# Patient Record
Sex: MALE | Race: WHITE | NOT HISPANIC OR LATINO | Employment: FULL TIME | URBAN - METROPOLITAN AREA
[De-identification: names, ages, dates, MRNs, and addresses within clinical notes are randomized per-mention and may not be internally consistent; named-entity substitution may affect disease eponyms.]

---

## 2018-10-17 ENCOUNTER — OFFICE VISIT (OUTPATIENT)
Dept: NEUROSURGERY | Facility: CLINIC | Age: 48
End: 2018-10-17

## 2018-10-17 VITALS
WEIGHT: 246 LBS | BODY MASS INDEX: 36.43 KG/M2 | SYSTOLIC BLOOD PRESSURE: 140 MMHG | DIASTOLIC BLOOD PRESSURE: 90 MMHG | HEIGHT: 69 IN | TEMPERATURE: 97.8 F

## 2018-10-17 DIAGNOSIS — Z72.0 TOBACCO ABUSE: ICD-10-CM

## 2018-10-17 DIAGNOSIS — G89.29 CHRONIC BILATERAL LOW BACK PAIN WITH RIGHT-SIDED SCIATICA: Primary | ICD-10-CM

## 2018-10-17 DIAGNOSIS — A15.9 TUBERCULOSIS: ICD-10-CM

## 2018-10-17 DIAGNOSIS — M54.41 CHRONIC BILATERAL LOW BACK PAIN WITH RIGHT-SIDED SCIATICA: Primary | ICD-10-CM

## 2018-10-17 DIAGNOSIS — B19.20 HEPATITIS C VIRUS INFECTION WITHOUT HEPATIC COMA, UNSPECIFIED CHRONICITY: ICD-10-CM

## 2018-10-17 PROCEDURE — 99243 OFF/OP CNSLTJ NEW/EST LOW 30: CPT | Performed by: NEUROLOGICAL SURGERY

## 2018-10-17 RX ORDER — TIZANIDINE 4 MG/1
4 TABLET ORAL NIGHTLY PRN
COMMUNITY

## 2018-10-17 RX ORDER — OMEPRAZOLE 40 MG/1
40 CAPSULE, DELAYED RELEASE ORAL DAILY
COMMUNITY

## 2018-10-17 RX ORDER — GABAPENTIN 800 MG/1
800 TABLET ORAL 3 TIMES DAILY
COMMUNITY

## 2018-10-17 RX ORDER — IBUPROFEN 800 MG/1
800 TABLET ORAL 3 TIMES DAILY
COMMUNITY

## 2018-10-17 NOTE — PROGRESS NOTES
Subjective     Chief Complaint: back and right leg pain    Patient ID: Frank Ho is a 48 y.o. male seen for consultation today at the request of  Ulysses Nick,*    Back Pain   This is a chronic problem. The current episode started more than 1 year ago. The problem occurs constantly. The problem is unchanged. The pain is present in the gluteal, lumbar spine and sacro-iliac. The quality of the pain is described as aching. The pain radiates to the right foot. The pain is at a severity of 7/10. The pain is moderate. The pain is worse during the day. The symptoms are aggravated by bending, twisting and standing. Stiffness is present in the morning. Associated symptoms include headaches and leg pain. Pertinent negatives include no abdominal pain, chest pain, dysuria, fever, numbness or weakness. Risk factors include obesity. He has tried analgesics, chiropractic manipulation, home exercises, heat and NSAIDs for the symptoms. The treatment provided no relief.   Leg Pain    Pertinent negatives include no numbness.       This is a 48-year-old man who presents to my office with an approximately 2 year history of low back pain and right-sided sciatica.  He has undergone pain management and physical therapy.  He is on high-dose Neurontin.  None of these modalities effectively alleviated his pain.  He denies any bladder/bowel incontinence.    He is an active tobacco abuser.  He also has a history of tuberculosis and hepatitis C.    The following portions of the patient's history were reviewed and updated as appropriate: allergies, current medications, past family history, past medical history, past social history, past surgical history and problem list.    Family history:   Family History   Problem Relation Age of Onset   • Cancer Maternal Grandfather    • Diabetes Maternal Grandfather        Social history:   Social History     Social History   • Marital status: Single     Spouse name: N/A   • Number of  children: N/A   • Years of education: N/A     Occupational History   • Not on file.     Social History Main Topics   • Smoking status: Current Every Day Smoker   • Smokeless tobacco: Not on file   • Alcohol use Yes   • Drug use: No   • Sexual activity: Defer     Other Topics Concern   • Not on file     Social History Narrative   • No narrative on file       Review of Systems   Constitutional: Negative for activity change, appetite change, chills, diaphoresis, fatigue, fever and unexpected weight change.   HENT: Positive for dental problem. Negative for congestion, drooling, ear discharge, ear pain, facial swelling, hearing loss, mouth sores, nosebleeds, postnasal drip, rhinorrhea, sinus pressure, sneezing, sore throat, tinnitus, trouble swallowing and voice change.    Eyes: Negative for photophobia, pain, discharge, redness, itching and visual disturbance.   Respiratory: Negative for apnea, cough, choking, chest tightness, shortness of breath, wheezing and stridor.    Cardiovascular: Negative for chest pain, palpitations and leg swelling.   Gastrointestinal: Negative for abdominal distention, abdominal pain, anal bleeding, blood in stool, constipation, diarrhea, nausea, rectal pain and vomiting.   Endocrine: Negative for cold intolerance, heat intolerance, polydipsia, polyphagia and polyuria.   Genitourinary: Negative for decreased urine volume, difficulty urinating, dysuria, enuresis, flank pain, frequency, genital sores, hematuria and urgency.   Musculoskeletal: Positive for arthralgias, back pain, myalgias, neck pain and neck stiffness. Negative for gait problem and joint swelling.   Skin: Negative for color change, pallor, rash and wound.   Allergic/Immunologic: Negative for environmental allergies, food allergies and immunocompromised state.   Neurological: Positive for headaches. Negative for dizziness, tremors, seizures, syncope, facial asymmetry, speech difficulty, weakness, light-headedness and numbness.  "  Hematological: Negative for adenopathy. Does not bruise/bleed easily.   Psychiatric/Behavioral: Negative for agitation, behavioral problems, confusion, decreased concentration, dysphoric mood, hallucinations, self-injury, sleep disturbance and suicidal ideas. The patient is not nervous/anxious and is not hyperactive.        Objective   Blood pressure 140/90, temperature 97.8 °F (36.6 °C), temperature source Temporal Artery , height 175.3 cm (69\"), weight 112 kg (246 lb).  Body mass index is 36.33 kg/m².    Physical Exam   Constitutional: He is oriented to person, place, and time. He appears well-developed.  Non-toxic appearance.   HENT:   Head: Normocephalic and atraumatic.   Right Ear: Hearing normal.   Left Ear: Hearing normal.   Nose: Nose normal.   Eyes: Pupils are equal, round, and reactive to light. Conjunctivae, EOM and lids are normal.   Neck: Normal range of motion. No JVD present.   Cardiovascular: Normal rate and regular rhythm.    Pulses:       Radial pulses are 2+ on the right side, and 2+ on the left side.   Pulmonary/Chest: Effort normal. No stridor. No respiratory distress. He has no wheezes.   Musculoskeletal:        Right hip: He exhibits normal range of motion and normal strength.        Left hip: He exhibits normal range of motion and normal strength.        Thoracic back: He exhibits no tenderness, no swelling, no pain and no spasm.        Lumbar back: He exhibits no tenderness, no bony tenderness, no swelling, no pain and no spasm.   Muscle Group    L          R  Hip Flexor          5          5  Knee Extensor  5          5  ADF                   5          5  APF                   5          5  EHL                   5          5   Neurological: He is alert and oriented to person, place, and time. He has normal strength. He displays abnormal reflex. No cranial nerve deficit or sensory deficit. He exhibits normal muscle tone. He displays a negative Romberg sign. GCS eye subscore is 4. GCS " verbal subscore is 5. GCS motor subscore is 6.   Reflex Scores:       Tricep reflexes are 2+ on the right side and 2+ on the left side.       Bicep reflexes are 2+ on the right side and 2+ on the left side.       Brachioradialis reflexes are 2+ on the right side and 2+ on the left side.       Patellar reflexes are 2+ on the right side and 2+ on the left side.       Achilles reflexes are 0 on the right side and 0 on the left side.  Unable to perform toe raises gait, however no objective motor weakness is detected in his bilateral lower extremities.   Skin: Skin is warm and dry. No rash noted. No erythema.   Psychiatric: He has a normal mood and affect. His behavior is normal. Judgment and thought content normal.   Nursing note and vitals reviewed.        Assessment/Plan     Independent Review of Radiographic Studies:      Available for my review is a MRI of the lumbar spine that was performed on 8/1/18.  This demonstrates moderate to severe, diffuse degenerative disc disease throughout the lumbar spine.  There is a grade 1 spondylolisthesis of L2 on L3.  There is moderate to severe facet arthropathy.  There is no significant lateral recess or neural foraminal stenosis at L3 4.  L4 5 is unremarkable with the exception of some moderate facet arthropathy.  There is L5-S1 demonstrates a broad-based disc bulge which is slightly eccentric to the right side and causes moderate lateral recess stenosis.  There is no significant neural foraminal stenosis there is moderate neural foraminal stenosis on the left side at L5-S1.  There is a suggestion of bilateral pars defects at L5-S1, but with no obvious evidence of a spondylolisthesis.    Medical Decision Making:      This is a 48-year-old man with severe right-sided sciatica and chronic low back pain.  His pain distribution is consistent with an L5 radiculopathy, with a component of an S1 radiculopathy as well.  There is no clear structural etiology for his sciatica on the  basis of his MRI, however I am suspicious that he has a bilateral pars defect and a subtle mobile spondylolisthesis which may be causing impingement of the exiting nerve root at L5-S1.  I have subsequently ordered a CT myelogram of the lumbar spine to assess for dynamic instability.  I would like to follow up with the patient after the aforementioned study has been completed.     was seen today for back pain and leg pain.    Diagnoses and all orders for this visit:    Chronic bilateral low back pain with right-sided sciatica  -     Case Request Cath Lab: IR myelogram, lumbar    Tuberculosis    Hepatitis C virus infection without hepatic coma, unspecified chronicity    Tobacco abuse        No Follow-up on file.           This document signed by LISBET Zendejas MD October 17, 2018 12:00 PM

## 2018-10-18 PROBLEM — M54.41 CHRONIC BILATERAL LOW BACK PAIN WITH RIGHT-SIDED SCIATICA: Status: ACTIVE | Noted: 2018-10-18

## 2018-10-18 PROBLEM — G89.29 CHRONIC BILATERAL LOW BACK PAIN WITH RIGHT-SIDED SCIATICA: Status: ACTIVE | Noted: 2018-10-18

## 2018-10-24 ENCOUNTER — HOSPITAL ENCOUNTER (OUTPATIENT)
Facility: HOSPITAL | Age: 48
Setting detail: HOSPITAL OUTPATIENT SURGERY
Discharge: HOME | End: 2018-10-24
Attending: RADIOLOGY | Admitting: NEUROLOGICAL SURGERY

## 2018-10-24 ENCOUNTER — APPOINTMENT (OUTPATIENT)
Dept: CT IMAGING | Facility: HOSPITAL | Age: 48
End: 2018-10-24

## 2018-10-24 VITALS
HEART RATE: 66 BPM | TEMPERATURE: 98 F | SYSTOLIC BLOOD PRESSURE: 126 MMHG | BODY MASS INDEX: 34.94 KG/M2 | OXYGEN SATURATION: 97 % | DIASTOLIC BLOOD PRESSURE: 87 MMHG | HEIGHT: 69 IN | WEIGHT: 235.89 LBS | RESPIRATION RATE: 16 BRPM

## 2018-10-24 DIAGNOSIS — M54.41 CHRONIC BILATERAL LOW BACK PAIN WITH RIGHT-SIDED SCIATICA: ICD-10-CM

## 2018-10-24 DIAGNOSIS — G89.29 CHRONIC BILATERAL LOW BACK PAIN WITH RIGHT-SIDED SCIATICA: ICD-10-CM

## 2018-10-24 PROCEDURE — 62304 MYELOGRAPHY LUMBAR INJECTION: CPT | Performed by: RADIOLOGY

## 2018-10-24 PROCEDURE — 72132 CT LUMBAR SPINE W/DYE: CPT

## 2018-10-24 PROCEDURE — 0 IOPAMIDOL 41 % SOLUTION: Performed by: RADIOLOGY

## 2018-10-24 RX ORDER — LIDOCAINE HYDROCHLORIDE 10 MG/ML
INJECTION, SOLUTION EPIDURAL; INFILTRATION; INTRACAUDAL; PERINEURAL AS NEEDED
Status: DISCONTINUED | OUTPATIENT
Start: 2018-10-24 | End: 2018-10-24 | Stop reason: HOSPADM

## 2018-10-24 NOTE — PLAN OF CARE
Problem: Patient Care Overview  Goal: Plan of Care Review  Outcome: Outcome(s) achieved Date Met: 10/24/18   10/24/18 1115   Coping/Psychosocial   Plan of Care Reviewed With patient   Plan of Care Review   Progress no change   OTHER   Outcome Summary The patient's site stable at time of discharge. The patient is being discharged home with family.      Goal: Individualization and Mutuality  Outcome: Outcome(s) achieved Date Met: 10/24/18    Goal: Discharge Needs Assessment  Outcome: Outcome(s) achieved Date Met: 10/24/18    Goal: Interprofessional Rounds/Family Conf  Outcome: Outcome(s) achieved Date Met: 10/24/18

## 2018-10-31 ENCOUNTER — OFFICE VISIT (OUTPATIENT)
Dept: NEUROSURGERY | Facility: CLINIC | Age: 48
End: 2018-10-31

## 2018-10-31 VITALS
TEMPERATURE: 97.6 F | BODY MASS INDEX: 36.14 KG/M2 | WEIGHT: 244 LBS | DIASTOLIC BLOOD PRESSURE: 90 MMHG | SYSTOLIC BLOOD PRESSURE: 148 MMHG | HEIGHT: 69 IN

## 2018-10-31 DIAGNOSIS — M54.41 CHRONIC BILATERAL LOW BACK PAIN WITH RIGHT-SIDED SCIATICA: Primary | ICD-10-CM

## 2018-10-31 DIAGNOSIS — G89.29 CHRONIC BILATERAL LOW BACK PAIN WITH RIGHT-SIDED SCIATICA: Primary | ICD-10-CM

## 2018-10-31 PROCEDURE — 99214 OFFICE O/P EST MOD 30 MIN: CPT | Performed by: NEUROLOGICAL SURGERY

## 2018-10-31 NOTE — PROGRESS NOTES
Subjective     Chief Complaint: Low back pain, right-sided sciatica    Patient ID: Frank Ho is a 48 y.o. male is here today for follow-up.    Back Pain   This is a chronic problem. The current episode started more than 1 year ago. The problem occurs constantly. The problem is unchanged. The pain is present in the gluteal, lumbar spine and sacro-iliac. The quality of the pain is described as aching. The pain radiates to the right foot. The pain is at a severity of 7/10. The pain is moderate. The pain is worse during the day. The symptoms are aggravated by bending, twisting and standing. Stiffness is present in the morning. Associated symptoms include headaches and leg pain. Pertinent negatives include no abdominal pain, chest pain, dysuria, fever, numbness or weakness. Risk factors include obesity. He has tried analgesics, chiropractic manipulation, home exercises, heat and NSAIDs for the symptoms. The treatment provided no relief.   Leg Pain    Pertinent negatives include no numbness.       This is a 48-year-old man who I saw in consultation last week for some subacute, progressive right-sided sciatica.  He did not have a clear structural etiology for his sciatica on the basis of his MRI.  I ordered a CT myelogram, and he presents today to discuss the results of the study.  He reports no significant change in his symptoms, and in fact if anything, they have gotten somewhat worse since his last visit.  Specifically, he is endorsing radiating, aching pain which extends down the lateral aspect of his right leg.    An additional interesting piece of history is that he reports some swelling and pain behind his right knee which she states was the inciting event for all of his current symptoms.    The following portions of the patient's history were reviewed and updated as appropriate: allergies, current medications, past family history, past medical history, past social history, past surgical history and problem  list.    Family history:   Family History   Problem Relation Age of Onset   • Cancer Maternal Grandfather    • Diabetes Maternal Grandfather        Social history:   Social History     Social History   • Marital status: Single     Spouse name: N/A   • Number of children: N/A   • Years of education: N/A     Occupational History   • Not on file.     Social History Main Topics   • Smoking status: Current Every Day Smoker     Packs/day: 1.00   • Smokeless tobacco: Never Used   • Alcohol use Yes      Comment: OCCASIONALLY   • Drug use: Yes     Types: Marijuana   • Sexual activity: Defer     Other Topics Concern   • Not on file     Social History Narrative   • No narrative on file       Review of Systems   Constitutional: Negative for activity change, appetite change, chills, diaphoresis, fatigue, fever and unexpected weight change.   HENT: Positive for dental problem. Negative for congestion, drooling, ear discharge, ear pain, facial swelling, hearing loss, mouth sores, nosebleeds, postnasal drip, rhinorrhea, sinus pressure, sneezing, sore throat, tinnitus, trouble swallowing and voice change.    Eyes: Negative for photophobia, pain, discharge, redness, itching and visual disturbance.   Respiratory: Negative for apnea, cough, choking, chest tightness, shortness of breath, wheezing and stridor.    Cardiovascular: Negative for chest pain, palpitations and leg swelling.   Gastrointestinal: Negative for abdominal distention, abdominal pain, anal bleeding, blood in stool, constipation, diarrhea, nausea, rectal pain and vomiting.   Endocrine: Negative for cold intolerance, heat intolerance, polydipsia, polyphagia and polyuria.   Genitourinary: Negative for decreased urine volume, difficulty urinating, dysuria, enuresis, flank pain, frequency, genital sores, hematuria and urgency.   Musculoskeletal: Positive for arthralgias, back pain, myalgias, neck pain and neck stiffness. Negative for gait problem and joint swelling.   Skin:  "Negative for color change, pallor, rash and wound.   Allergic/Immunologic: Negative for environmental allergies, food allergies and immunocompromised state.   Neurological: Positive for headaches. Negative for dizziness, tremors, seizures, syncope, facial asymmetry, speech difficulty, weakness, light-headedness and numbness.   Hematological: Negative for adenopathy. Does not bruise/bleed easily.   Psychiatric/Behavioral: Negative for agitation, behavioral problems, confusion, decreased concentration, dysphoric mood, hallucinations, self-injury, sleep disturbance and suicidal ideas. The patient is not nervous/anxious and is not hyperactive.        Objective   Blood pressure 148/90, temperature 97.6 °F (36.4 °C), temperature source Temporal Artery , height 175.3 cm (69.02\"), weight 111 kg (244 lb).  Body mass index is 36.02 kg/m².    Physical Exam   Constitutional: He is oriented to person, place, and time. He appears well-developed.  Non-toxic appearance.   HENT:   Head: Normocephalic and atraumatic.   Right Ear: Hearing normal.   Left Ear: Hearing normal.   Nose: Nose normal.   Eyes: Pupils are equal, round, and reactive to light. Conjunctivae, EOM and lids are normal.   Neck: Normal range of motion. No JVD present.   Cardiovascular: Normal rate and regular rhythm.    Pulses:       Radial pulses are 2+ on the right side, and 2+ on the left side.   Pulmonary/Chest: Effort normal. No stridor. No respiratory distress. He has no wheezes.   Musculoskeletal:        Right hip: He exhibits normal range of motion and normal strength.        Left hip: He exhibits normal range of motion and normal strength.        Thoracic back: He exhibits no tenderness, no swelling, no pain and no spasm.        Lumbar back: He exhibits no tenderness, no bony tenderness, no swelling, no pain and no spasm.   Muscle Group    L          R  Hip Flexor          5          5  Knee Extensor  5          5  ADF                   5          5  APF      "              5          5  [facility]                   5          5   Neurological: He is alert and oriented to person, place, and time. He has normal strength. He displays abnormal reflex. No cranial nerve deficit or sensory deficit. He exhibits normal muscle tone. He displays a negative Romberg sign. GCS eye subscore is 4. GCS verbal subscore is 5. GCS motor subscore is 6.   Reflex Scores:       Tricep reflexes are 2+ on the right side and 2+ on the left side.       Bicep reflexes are 2+ on the right side and 2+ on the left side.       Brachioradialis reflexes are 2+ on the right side and 2+ on the left side.       Patellar reflexes are 2+ on the right side and 2+ on the left side.       Achilles reflexes are 0 on the right side and 0 on the left side.  Unable to perform toe raises gait, however no objective motor weakness is detected in his bilateral lower extremities.   Skin: Skin is warm and dry. No rash noted. No erythema.   Psychiatric: He has a normal mood and affect. His behavior is normal. Judgment and thought content normal.   Nursing note and vitals reviewed.        Assessment/Plan     Independent Review of Radiographic Studies:      Available for my review is a CT myelogram of the lumbar spine.  There are advanced degenerative changes at the L5-S1 interspace.  There is no evidence of inducible instability, or pars defect.  There is no obvious evidence of nerve root compression.  There is some mild compression in the upright position at the L4 5 level.    Medical Decision Making:      The patient's imaging studies are really unremarkable for the etiology of his severe, refractory L5 radiculopathy.  I have requested an intraforaminal block at L5-S1 on the right side with Dr. alejo.  If this alleviates the patient's pain, then we could entertain the possibility of either a fusion or an L5-S1 foraminotomy, however I'm not enthusiastic as I don't really think that the imaging abnormalities correlate with the  patient's severe degree of impairment.  I like to try an aggressive course of pain management and physical therapy before he undergo the possibility of surgical intervention.  I like to follow up with him after his nerve block.     was seen today for back pain and leg pain.    Diagnoses and all orders for this visit:    Chronic bilateral low back pain with right-sided sciatica  -     Ambulatory Referral to Pain Management  -     Ambulatory Referral to Physical Therapy Evaluate and treat        Return in about 3 months (around 1/31/2019).           This document signed by LISBET Zendejas MD October 31, 2018 1:30 PM

## 2023-05-03 NOTE — H&P (VIEW-ONLY)
Subjective     Chief Complaint: back and right leg pain    Patient ID: Frank Ho is a 48 y.o. male seen for consultation today at the request of  Ulysses Nick,*    Back Pain   This is a chronic problem. The current episode started more than 1 year ago. The problem occurs constantly. The problem is unchanged. The pain is present in the gluteal, lumbar spine and sacro-iliac. The quality of the pain is described as aching. The pain radiates to the right foot. The pain is at a severity of 7/10. The pain is moderate. The pain is worse during the day. The symptoms are aggravated by bending, twisting and standing. Stiffness is present in the morning. Associated symptoms include headaches and leg pain. Pertinent negatives include no abdominal pain, chest pain, dysuria, fever, numbness or weakness. Risk factors include obesity. He has tried analgesics, chiropractic manipulation, home exercises, heat and NSAIDs for the symptoms. The treatment provided no relief.   Leg Pain    Pertinent negatives include no numbness.       This is a 48-year-old man who presents to my office with an approximately 2 year history of low back pain and right-sided sciatica.  He has undergone pain management and physical therapy.  He is on high-dose Neurontin.  None of these modalities effectively alleviated his pain.  He denies any bladder/bowel incontinence.    He is an active tobacco abuser.  He also has a history of tuberculosis and hepatitis C.    The following portions of the patient's history were reviewed and updated as appropriate: allergies, current medications, past family history, past medical history, past social history, past surgical history and problem list.    Family history:   Family History   Problem Relation Age of Onset   • Cancer Maternal Grandfather    • Diabetes Maternal Grandfather        Social history:   Social History     Social History   • Marital status: Single     Spouse name: N/A   • Number of  children: N/A   • Years of education: N/A     Occupational History   • Not on file.     Social History Main Topics   • Smoking status: Current Every Day Smoker   • Smokeless tobacco: Not on file   • Alcohol use Yes   • Drug use: No   • Sexual activity: Defer     Other Topics Concern   • Not on file     Social History Narrative   • No narrative on file       Review of Systems   Constitutional: Negative for activity change, appetite change, chills, diaphoresis, fatigue, fever and unexpected weight change.   HENT: Positive for dental problem. Negative for congestion, drooling, ear discharge, ear pain, facial swelling, hearing loss, mouth sores, nosebleeds, postnasal drip, rhinorrhea, sinus pressure, sneezing, sore throat, tinnitus, trouble swallowing and voice change.    Eyes: Negative for photophobia, pain, discharge, redness, itching and visual disturbance.   Respiratory: Negative for apnea, cough, choking, chest tightness, shortness of breath, wheezing and stridor.    Cardiovascular: Negative for chest pain, palpitations and leg swelling.   Gastrointestinal: Negative for abdominal distention, abdominal pain, anal bleeding, blood in stool, constipation, diarrhea, nausea, rectal pain and vomiting.   Endocrine: Negative for cold intolerance, heat intolerance, polydipsia, polyphagia and polyuria.   Genitourinary: Negative for decreased urine volume, difficulty urinating, dysuria, enuresis, flank pain, frequency, genital sores, hematuria and urgency.   Musculoskeletal: Positive for arthralgias, back pain, myalgias, neck pain and neck stiffness. Negative for gait problem and joint swelling.   Skin: Negative for color change, pallor, rash and wound.   Allergic/Immunologic: Negative for environmental allergies, food allergies and immunocompromised state.   Neurological: Positive for headaches. Negative for dizziness, tremors, seizures, syncope, facial asymmetry, speech difficulty, weakness, light-headedness and numbness.  "  Hematological: Negative for adenopathy. Does not bruise/bleed easily.   Psychiatric/Behavioral: Negative for agitation, behavioral problems, confusion, decreased concentration, dysphoric mood, hallucinations, self-injury, sleep disturbance and suicidal ideas. The patient is not nervous/anxious and is not hyperactive.        Objective   Blood pressure 140/90, temperature 97.8 °F (36.6 °C), temperature source Temporal Artery , height 175.3 cm (69\"), weight 112 kg (246 lb).  Body mass index is 36.33 kg/m².    Physical Exam   Constitutional: He is oriented to person, place, and time. He appears well-developed.  Non-toxic appearance.   HENT:   Head: Normocephalic and atraumatic.   Right Ear: Hearing normal.   Left Ear: Hearing normal.   Nose: Nose normal.   Eyes: Pupils are equal, round, and reactive to light. Conjunctivae, EOM and lids are normal.   Neck: Normal range of motion. No JVD present.   Cardiovascular: Normal rate and regular rhythm.    Pulses:       Radial pulses are 2+ on the right side, and 2+ on the left side.   Pulmonary/Chest: Effort normal. No stridor. No respiratory distress. He has no wheezes.   Musculoskeletal:        Right hip: He exhibits normal range of motion and normal strength.        Left hip: He exhibits normal range of motion and normal strength.        Thoracic back: He exhibits no tenderness, no swelling, no pain and no spasm.        Lumbar back: He exhibits no tenderness, no bony tenderness, no swelling, no pain and no spasm.   Muscle Group    L          R  Hip Flexor          5          5  Knee Extensor  5          5  ADF                   5          5  APF                   5          5  EHL                   5          5   Neurological: He is alert and oriented to person, place, and time. He has normal strength. He displays abnormal reflex. No cranial nerve deficit or sensory deficit. He exhibits normal muscle tone. He displays a negative Romberg sign. GCS eye subscore is 4. GCS " verbal subscore is 5. GCS motor subscore is 6.   Reflex Scores:       Tricep reflexes are 2+ on the right side and 2+ on the left side.       Bicep reflexes are 2+ on the right side and 2+ on the left side.       Brachioradialis reflexes are 2+ on the right side and 2+ on the left side.       Patellar reflexes are 2+ on the right side and 2+ on the left side.       Achilles reflexes are 0 on the right side and 0 on the left side.  Unable to perform toe raises gait, however no objective motor weakness is detected in his bilateral lower extremities.   Skin: Skin is warm and dry. No rash noted. No erythema.   Psychiatric: He has a normal mood and affect. His behavior is normal. Judgment and thought content normal.   Nursing note and vitals reviewed.        Assessment/Plan     Independent Review of Radiographic Studies:      Available for my review is a MRI of the lumbar spine that was performed on 8/1/18.  This demonstrates moderate to severe, diffuse degenerative disc disease throughout the lumbar spine.  There is a grade 1 spondylolisthesis of L2 on L3.  There is moderate to severe facet arthropathy.  There is no significant lateral recess or neural foraminal stenosis at L3 4.  L4 5 is unremarkable with the exception of some moderate facet arthropathy.  There is L5-S1 demonstrates a broad-based disc bulge which is slightly eccentric to the right side and causes moderate lateral recess stenosis.  There is no significant neural foraminal stenosis there is moderate neural foraminal stenosis on the left side at L5-S1.  There is a suggestion of bilateral pars defects at L5-S1, but with no obvious evidence of a spondylolisthesis.    Medical Decision Making:      This is a 48-year-old man with severe right-sided sciatica and chronic low back pain.  His pain distribution is consistent with an L5 radiculopathy, with a component of an S1 radiculopathy as well.  There is no clear structural etiology for his sciatica on the  basis of his MRI, however I am suspicious that he has a bilateral pars defect and a subtle mobile spondylolisthesis which may be causing impingement of the exiting nerve root at L5-S1.  I have subsequently ordered a CT myelogram of the lumbar spine to assess for dynamic instability.  I would like to follow up with the patient after the aforementioned study has been completed.     was seen today for back pain and leg pain.    Diagnoses and all orders for this visit:    Chronic bilateral low back pain with right-sided sciatica  -     Case Request Cath Lab: IR myelogram, lumbar    Tuberculosis    Hepatitis C virus infection without hepatic coma, unspecified chronicity    Tobacco abuse        No Follow-up on file.           This document signed by LISBET Zendejas MD October 17, 2018 12:00 PM       Clofazimine Counseling:  I discussed with the patient the risks of clofazimine including but not limited to skin and eye pigmentation, liver damage, nausea/vomiting, gastrointestinal bleeding and allergy.

## (undated) DEVICE — TRY L/P SFTY A/20G